# Patient Record
Sex: MALE | Race: WHITE | URBAN - METROPOLITAN AREA
[De-identification: names, ages, dates, MRNs, and addresses within clinical notes are randomized per-mention and may not be internally consistent; named-entity substitution may affect disease eponyms.]

---

## 2020-10-07 ENCOUNTER — OFFICE VISIT (OUTPATIENT)
Dept: URBAN - METROPOLITAN AREA CLINIC 54 | Facility: CLINIC | Age: 65
End: 2020-10-07
Payer: COMMERCIAL

## 2020-10-07 DIAGNOSIS — H17.9 CORNEAL SCAR: Primary | ICD-10-CM

## 2020-10-07 PROCEDURE — 76512 OPH US DX B-SCAN: CPT | Performed by: OPHTHALMOLOGY

## 2020-10-07 PROCEDURE — 92004 COMPRE OPH EXAM NEW PT 1/>: CPT | Performed by: OPHTHALMOLOGY

## 2020-10-07 ASSESSMENT — INTRAOCULAR PRESSURE
OD: 24
OS: 13

## 2020-10-07 NOTE — IMPRESSION/PLAN
Impression: Corneal scar: H17.9.
B-scan OD 10/7/20 = vitreous fibers and strands c/w old vitritis vs VH; no RD/choroidals Plan: H/o perforated scar. H/o hemorrhage. Here for evaluation of viabillity for PK. Monoc precautions B-scan today do not show phthisical changes, choroidals, RD. Given the patient's history, limited [facility] prognosis. Would rec f/u with Dr. Kendra Scanlon. 

prn RCA